# Patient Record
Sex: MALE | Race: BLACK OR AFRICAN AMERICAN | NOT HISPANIC OR LATINO | URBAN - METROPOLITAN AREA
[De-identification: names, ages, dates, MRNs, and addresses within clinical notes are randomized per-mention and may not be internally consistent; named-entity substitution may affect disease eponyms.]

---

## 2024-03-29 ENCOUNTER — EMERGENCY (EMERGENCY)
Facility: HOSPITAL | Age: 44
LOS: 1 days | Discharge: HOME | End: 2024-03-29
Attending: EMERGENCY MEDICINE
Payer: MEDICAID

## 2024-03-29 VITALS
HEART RATE: 92 BPM | OXYGEN SATURATION: 97 % | RESPIRATION RATE: 18 BRPM | SYSTOLIC BLOOD PRESSURE: 174 MMHG | WEIGHT: 300.05 LBS | DIASTOLIC BLOOD PRESSURE: 111 MMHG | TEMPERATURE: 98 F | HEIGHT: 73 IN

## 2024-03-29 PROCEDURE — 90715 TDAP VACCINE 7 YRS/> IM: CPT

## 2024-03-29 PROCEDURE — 12013 RPR F/E/E/N/L/M 2.6-5.0 CM: CPT

## 2024-03-29 PROCEDURE — 99284 EMERGENCY DEPT VISIT MOD MDM: CPT | Mod: 25

## 2024-03-29 PROCEDURE — 90471 IMMUNIZATION ADMIN: CPT

## 2024-03-29 PROCEDURE — 72125 CT NECK SPINE W/O DYE: CPT | Mod: 26,MC

## 2024-03-29 PROCEDURE — 72125 CT NECK SPINE W/O DYE: CPT | Mod: MC

## 2024-03-29 PROCEDURE — 70450 CT HEAD/BRAIN W/O DYE: CPT | Mod: 26,MC

## 2024-03-29 PROCEDURE — 70450 CT HEAD/BRAIN W/O DYE: CPT | Mod: MC

## 2024-03-29 PROCEDURE — 99283 EMERGENCY DEPT VISIT LOW MDM: CPT | Mod: 25

## 2024-03-29 RX ORDER — LIDOCAINE HYDROCHLORIDE AND EPINEPHRINE 10; 10 MG/ML; UG/ML
5 INJECTION, SOLUTION INFILTRATION; PERINEURAL ONCE
Refills: 0 | Status: COMPLETED | OUTPATIENT
Start: 2024-03-29 | End: 2024-03-29

## 2024-03-29 RX ORDER — TETANUS TOXOID, REDUCED DIPHTHERIA TOXOID AND ACELLULAR PERTUSSIS VACCINE, ADSORBED 5; 2.5; 8; 8; 2.5 [IU]/.5ML; [IU]/.5ML; UG/.5ML; UG/.5ML; UG/.5ML
0.5 SUSPENSION INTRAMUSCULAR ONCE
Refills: 0 | Status: COMPLETED | OUTPATIENT
Start: 2024-03-29 | End: 2024-03-29

## 2024-03-29 RX ADMIN — TETANUS TOXOID, REDUCED DIPHTHERIA TOXOID AND ACELLULAR PERTUSSIS VACCINE, ADSORBED 0.5 MILLILITER(S): 5; 2.5; 8; 8; 2.5 SUSPENSION INTRAMUSCULAR at 23:19

## 2024-03-29 RX ADMIN — LIDOCAINE HYDROCHLORIDE AND EPINEPHRINE 5 MILLILITER(S): 10; 10 INJECTION, SOLUTION INFILTRATION; PERINEURAL at 22:21

## 2024-03-29 NOTE — ED PROCEDURE NOTE - ATTENDING CONTRIBUTION TO CARE
I Cristhian Peters MD discussed the procedure with the resident and or ACP and went over risks and benefits as well as indications for the procedure. I was present for key portions of the procedure itself and assisted as needed.

## 2024-03-29 NOTE — ED PROVIDER NOTE - PROGRESS NOTE DETAILS
Elvira SOTO: Patient re-evaluated and feeling improved.  Radiology tests reviewed with patient and family.  Patient stable for discharge. Follow up instructions given, importance of follow up emphasized, return to ED parameters reviewed and patient verbalized understanding.  All questions answered, all concerns addressed.

## 2024-03-29 NOTE — ED ADULT NURSE NOTE - ADDITIONAL LOCATION
Occupational History    Not on file   Tobacco Use    Smoking status: Never    Smokeless tobacco: Not on file   Vaping Use    Vaping Use: Never used   Substance and Sexual Activity    Alcohol use: Not Currently    Drug use: Never    Sexual activity: Not Currently   Other Topics Concern    Not on file   Social History Narrative    Not on file     Social Determinants of Health     Financial Resource Strain: Not on file   Food Insecurity: Not on file   Transportation Needs: Not on file   Physical Activity: Not on file   Stress: Not on file   Social Connections: Not on file   Intimate Partner Violence: Not on file   Housing Stability: Not on file      [] Unable to obtain due to ventilated and/ or neurologic status    Home Medications:      Medications Prior to Admission: nitrofurantoin, macrocrystal-monohydrate, (MACROBID) 100 MG capsule, Take 1 capsule by mouth 2 times daily for 10 days (Patient not taking: Reported on 8/15/2023)  tamsulosin (FLOMAX) 0.4 MG capsule, Take 1 capsule by mouth daily  phenazopyridine (PYRIDIUM) 100 MG tablet, Take 1 tablet by mouth 3 times daily as needed for Pain (bladder spasm/pain)  gabapentin (NEURONTIN) 300 MG capsule, Take 1 capsule by mouth 3 times daily.   aspirin 81 MG chewable tablet, Take 1 tablet by mouth daily  clopidogrel (PLAVIX) 75 MG tablet, Take 1 tablet by mouth daily  pravastatin (PRAVACHOL) 40 MG tablet, Take 1 tablet by mouth daily  oxyCODONE-acetaminophen (PERCOCET)  MG per tablet, Take 1 tablet by mouth every 4 hours as needed for Pain.  losartan (COZAAR) 25 MG tablet, Take 1 tablet by mouth daily  DOCUSATE SODIUM PO, Take 100 mg by mouth  glipiZIDE (GLUCOTROL XL) 5 MG extended release tablet, Take 1 tablet by mouth  baclofen (LIORESAL) 20 MG tablet, Take 1 tablet by mouth 3 times daily    Current Hospital Medications:   Scheduled Meds:   enoxaparin  40 mg SubCUTAneous Daily    clopidogrel  75 mg Oral Daily    gabapentin  300 mg Oral TID    losartan  25 mg Oral reviewed  Scheduled Meds:   enoxaparin  40 mg SubCUTAneous Daily    clopidogrel  75 mg Oral Daily    gabapentin  300 mg Oral TID    losartan  25 mg Oral Daily    pravastatin  40 mg Oral Daily    pantoprazole (PROTONIX) 40 mg injection  40 mg IntraVENous Daily    tamsulosin  0.4 mg Oral Daily    finasteride  5 mg Oral Daily     Continuous Infusions:   sodium chloride Stopped (08/15/23 0227)    dextrose 5 % and 0.45 % NaCl 150 mL/hr at 08/15/23 0227    insulin 0.0125 Units/kg/hr (08/15/23 0622)       Recent Labs     08/12/23  1645 08/14/23  1745 08/14/23  2055 08/15/23  0446   WBC 6.3 7.2  --  6.5   HGB 16.0 15.5 16.2 13.0*   HCT 47.9 45.4  --  37.9*   MCV 88.5 88.2  --  88.0    269  --  233     Recent Labs     08/12/23  1645 08/12/23  1654 08/14/23  1745 08/14/23  2055 08/15/23  0446   *  --  132*  --  134*   K 5.3*  --  4.5  --  3.9   CL 92*  --  94*  --  105   CO2 16*  --  14*  --  18*   PHOS  --   --   --   --  1.3*   BUN 10  --  7  --  5*   CREATININE 0.81   < > 0.79 0.3* 0.69*    < > = values in this interval not displayed. Recent Labs     08/12/23 1645 08/14/23 1745   AST 12 12   ALT 16 14   BILITOT 0.5 0.5   ALKPHOS 103 95     Recent Labs     08/12/23 1645   LIPASE 13     Recent Labs     08/12/23 1645 08/14/23 1745   PROT 8.7* 8.5*     CT ABDOMEN PELVIS W IV CONTRAST Additional Contrast? None    Result Date: 8/12/2023  EXAMINATION: CT OF THE ABDOMEN AND PELVIS WITH CONTRAST 8/12/2023 5:20 pm TECHNIQUE: CT of the abdomen and pelvis was performed with the administration of intravenous contrast. Multiplanar reformatted images are provided for review. Automated exposure control, iterative reconstruction, and/or weight based adjustment of the mA/kV was utilized to reduce the radiation dose to as low as reasonably achievable.  COMPARISON: 05/26/2018 HISTORY: ORDERING SYSTEM PROVIDED HISTORY: increasing pelvic pain TECHNOLOGIST PROVIDED HISTORY: Additional Contrast?->None Reason for additional location...

## 2024-03-29 NOTE — ED ADULT NURSE NOTE - OBJECTIVE STATEMENT
43y M A&Ox4 BIBEMS s/p fall. As per EMS, PT was at the [employer] and fooling around outside when he lost his footing and fell forward into a metal gate. Pt sustained a laceration to the left eyebrow, small laceration to right forehead and abrasion to the right side of the mouth. EMS also states pt had a few drinks earlier today, however did not LOC and was ambulatory on scene. Pt states he was fooling around and lost his footing and fell into a spiked gate. Upon assessment pt head wrapped by EMS. Oozing blood noted to left side of forehead. When removed, pt has 4cm laceration to left eyebrow, 1.5cm to right nadeem and abrasion from the left side of the mouth to the left jaw. No uncontrolled bleeding noted. Pt hypertensive (165/109). Denies any pain, LOC, Ha, N/V/D/CP/SOB/Gi symptoms. NO PMH or PSH.

## 2024-03-29 NOTE — ED PROVIDER NOTE - OBJECTIVE STATEMENT
43-year-old male past medical history of hypertension now presenting after a mechanical/accidental fall ground-level at 7 PM today without loss of consciousness/seizures.  Patient denying additional physical injuries.  Denies fevers, sore throat, headaches, vision changes, neck pain/stiffness, shortness of breath, chest pain, nausea/vomiting, abdominal pain, skin bruising/ecchymosis, urinary/bowel complaints.

## 2024-03-29 NOTE — ED PROVIDER NOTE - PATIENT PORTAL LINK FT
You can access the FollowMyHealth Patient Portal offered by [facility] by registering at the following website: [email or web address]. By joining [employer]’s FollowMyHealth portal, you will also be able to view your health information using other applications (apps) compatible with our system.

## 2024-03-29 NOTE — ED PROVIDER NOTE - PHYSICAL EXAMINATION
PHYSICAL EXAM:  GENERAL: NAD, lying in bed comfortably  HEAD: Large vertical laceration 2 inch to left forehead, depth identified to bone, no foreign bodies noted. Small <1 cm on lateral end of right eyebrow, no foreign bodies identified.   EYES: EOMI, PERRLA, conjunctiva and sclera clear  ENT: No erythema/pallor/petechiae/lesions  NECK: Supple  LUNG: CTA b/l; no r/r/w  HEART: RRR, +S1/S2; No m/r/g  ABDOMEN: soft, NT/ND; BS audible   EXTREMITIES:  2+ Peripheral Pulses, brisk cap refill. No clubbing, cyanosis, or edema  NERVOUS SYSTEM:  AAOx3, speech clear. No sensory/motor deficits   SKIN: No rashes or lesions

## 2024-03-29 NOTE — ED PROVIDER NOTE - CLINICAL SUMMARY MEDICAL DECISION MAKING FREE TEXT BOX
43-year-old male past medical history of hypertension now presenting after a mechanical/accidental fall ground-level at 7 PM today. VSS. On exam Large vertical laceration 2 inch to left forehead, depth identified to bone, no foreign bodies noted. Small <1 cm on lateral end of right eyebrow, no foreign bodies identified. Will obtain CT Head/C-spine to r/o fracture, ICH. Will offer primary repair.

## 2024-03-29 NOTE — ED PROVIDER NOTE - NSFOLLOWUPINSTRUCTIONS_ED_ALL_ED_FT
Laceration Care, Adult  A laceration is a cut that may go through all layers of the skin and into the tissue that is right under the skin. Some lacerations heal on their own. Others need to be closed with stitches (sutures), staples, skin adhesive strips, or skin glue.    Proper care of a laceration reduces the risk for infection, helps the laceration heal better, and may prevent scarring.    General tips  Keep the wound clean and dry.  Do not scratch or pick at the wound.  Wash your hands with soap and water for at least 20 seconds before and after touching your wound or changing your bandage (dressing). If soap and water are not available, use hand .  Do not usedisinfectants or antiseptics, such as rubbing alcohol, to clean your wound unless told by your health care provider.  If you were given a dressing, you should change it at least once a day, or as told by your health care provider. You should also change it if it becomes wet or dirty.  How to care for your laceration  If sutures or staples were used:    Keep the wound completely dry for the first 24 hours, or as told by your health care provider. After that time, you may shower or bathe. Do not soak your wound in water until after the sutures or staples have been removed.  Clean the wound once each day, or as told by your health care provider. To do this:  Wash the wound with soap and water.  Rinse the wound with water to remove all soap.  Pat the wound dry with a clean towel. Do not rub the wound.  After cleaning the wound, apply a thin layer of antibiotic ointment, other topical ointments, or a non-adherent dressing as told by your health care provider. This will help prevent infection and keep the dressing from sticking to the wound.  Have the sutures or staples removed as told by your health care provider. Do not remove sutures or staples yourself.  If skin adhesive strips were used:    Do not get the skin adhesive strips wet. You may shower or bathe, but keep the wound dry.  If the wound gets wet, pat it dry with a clean towel. Do not rub the wound.  Skin adhesive strips fall off on their own. If adhesive strip edges start to loosen and curl up, you may trim the loose edges. Do not remove adhesive strips completely unless your health care provider tells you to do that.  If skin glue was used:    You may shower or bathe, but try to keep the wound dry. Do not soak the wound in water.  After showering or bathing, pat the wound dry with a clean towel. Do not rub the wound.  Do not do any activities that will make you sweat a lot until the skin glue has fallen off.  Do not apply liquid, cream, or ointment medicine to the wound while the skin glue is in place. Doing this may loosen the film before the wound has healed.  If a dressing is placed over the wound, do not apply tape directly over the skin glue. Doing this may cause the glue to be pulled off before the wound has healed.  Do not pick at the glue. Skin glue usually remains in place for 5–10 days and then falls off the skin.  Follow these instructions at home:  Medicines    Take over-the-counter and prescription medicines only as told by your health care provider.  If you were prescribed an antibiotic medicine or ointment, take or apply it as told by your health care provider. Do not stop using it even if your condition improves.  Managing pain and swelling    If directed, put ice on the injured area. To do this:  Put ice in a plastic bag.  Place a towel between your skin and the bag.  Leave the ice on for 20 minutes, 2–3 times a day.  Remove the ice if your skin turns bright red. This is very important. If you cannot feel pain, heat, or cold, you have a greater risk of damage to the area.  Raise (elevate) the injured area above the level of your heart while you are sitting or lying down for the first 24–48 hours after the laceration is repaired.  General instructions    Two wounds closed with skin glue. One is normal. The other is red with pus and infected.  Avoid any activity that could cause your wound to reopen.  Check your wound every day for signs of infection. Watch for:  More redness, swelling, or pain.  Fluid or blood.  Warmth.  Pus or a bad smell.  Keep all follow-up visits. This is important.  Contact a health care provider if:  You received a tetanus shot and you have swelling, severe pain, redness, or bleeding at the injection site.  Your closed wound breaks open.  You have any of these signs of infection:  More redness, swelling, or pain around your wound.  Fluid or blood coming from your wound.  Warmth coming from your wound.  Pus or a bad smell coming from your wound.  A fever.  You notice something coming out of the wound, such as wood or glass.  Your pain is not controlled with medicine.  You notice a change in the color of your skin near your wound.  You need to change the dressing often.  You develop a new rash.  You have numbness around the wound.  Get help right away if:  You develop severe swelling around the wound.  Your pain suddenly increases and is severe.  You develop painful lumps near the wound or on skin anywhere else on your body.  You have a red streak going away from your wound.  The wound is on your hand or foot, and you cannot properly move a finger or toe.  The wound is on your hand or foot, and you notice that your fingers or toes look pale or bluish.    Summary    A laceration is a cut that may go through all layers of the skin and into the tissue that is right under the skin.  Some lacerations heal on their own. Others need to be closed with stitches (sutures), staples, skin adhesive strips, or skin glue.  Proper care of a laceration reduces the risk of infection, helps the laceration heal better, and may prevent scarring.  This information is not intended to replace advice given to you by your health care provider. Make sure you discuss any questions you have with your health care provider.

## 2024-03-29 NOTE — ED ADULT NURSE NOTE - DISCHARGE DATE/TIME
Called patient regarding ccm assessment and left a message for patient to call back when they can. Reviewed patient chart. Left contact my contact number [phone].
30-Mar-2024 00:05

## 2024-03-29 NOTE — ED PROVIDER NOTE - ATTENDING CONTRIBUTION TO CARE
I performed a history and physical exam of the patient and discussed their management with the resident and /or advanced care provider. I reviewed the resident and /or ACP's note and agree with the documented findings and plan of care. My medical decision making and observations are found above.  Lungs clear, neck non tender, nl neuro exam, Large forehead lac.

## 2024-03-30 VITALS
HEART RATE: 79 BPM | SYSTOLIC BLOOD PRESSURE: 153 MMHG | DIASTOLIC BLOOD PRESSURE: 89 MMHG | TEMPERATURE: 98 F | OXYGEN SATURATION: 99 % | RESPIRATION RATE: 18 BRPM

## 2024-04-06 ENCOUNTER — EMERGENCY (EMERGENCY)
Facility: HOSPITAL | Age: 44
LOS: 1 days | Discharge: HOME | End: 2024-04-06
Attending: EMERGENCY MEDICINE
Payer: MEDICAID

## 2024-04-06 VITALS
DIASTOLIC BLOOD PRESSURE: 112 MMHG | HEART RATE: 76 BPM | OXYGEN SATURATION: 97 % | RESPIRATION RATE: 18 BRPM | SYSTOLIC BLOOD PRESSURE: 163 MMHG

## 2024-04-06 VITALS
SYSTOLIC BLOOD PRESSURE: 190 MMHG | RESPIRATION RATE: 18 BRPM | OXYGEN SATURATION: 97 % | DIASTOLIC BLOOD PRESSURE: 123 MMHG | TEMPERATURE: 98 F | HEIGHT: 73 IN | HEART RATE: 85 BPM | WEIGHT: 300.05 LBS

## 2024-04-06 PROBLEM — Z78.9 OTHER SPECIFIED HEALTH STATUS: Chronic | Status: ACTIVE | Noted: 2024-03-29

## 2024-04-06 PROCEDURE — 99282 EMERGENCY DEPT VISIT SF MDM: CPT

## 2024-04-06 PROCEDURE — L9995: CPT

## 2024-04-06 NOTE — ED PROVIDER NOTE - PATIENT PORTAL LINK FT
You can access the FollowMyHealth Patient Portal offered by [facility] by registering at the following website: [email or web address]. By joining [employer]’s FollowMyHealth portal, you will also be able to view your health information using other applications (apps) compatible with our system.

## 2024-04-06 NOTE — ED PROVIDER NOTE - OBJECTIVE STATEMENT
43-year-old male with past medical history of hypertension presenting for suture removal. Patient fell last week requiring 10 sutures above left eyebrow and 1 suture over the right eyebrow. Patient presenting today for removal. Patient was noted to have high blood pressure in triage however patient states he did not take his blood pressure medication today. Patient denies headaches, changes in vision, nausea, vomiting, chest pain, difficulty breathing, abdominal pain.

## 2024-04-06 NOTE — ED ADULT NURSE NOTE - OBJECTIVE STATEMENT
43 y.o male, A&Ox4, PMH HTN, pt presents to ED c/o suture removal. pt states he banged his head last friday that required stitches, pt presents today for removal of stitches. pt states he has HTN but has not taken his BP meds this morning. pt denies chest pain, sob, N/V/D, fevers, chills. pt safety and comfort provided.

## 2024-04-06 NOTE — ED PROVIDER NOTE - NSFOLLOWUPINSTRUCTIONS_ED_ALL_ED_FT
Please follow up with your primary care physician within the next 48-72 hours for blood pressure management.    You may take Tylenol (1000mg) every 6 hours for pain control.     Please return to the emergency department if you experience any of the following symptoms:    Fever  Chest pain  Difficulty breathing  Abdominal pain  Nausea  Vomiting

## 2024-04-06 NOTE — ED PROVIDER NOTE - ATTENDING CONTRIBUTION TO CARE
attending Ray: 43yM h/o HTN presents for suture removal. Had fall last week with 10 sutures placed above L eyebrow and 1 over R eyebrow. Healing well. Exam as above. Did not take BP medication today. Will remove sutures, dc with return precautions

## 2024-04-06 NOTE — ED PROVIDER NOTE - PHYSICAL EXAMINATION
General: Appears well and nontoxic  Mentation: AAO x 3  psych: mood appropriate  HEENT: airway patent, conjunctivae clear bilaterally  Resp: symmetric chest rise, no resp distress, breath sounds CTA bilaterally  Cardio: RRR, no m/r/g  GI: soft/nondistended/nontender  Neuro: sensation and motor function grossly intact  Skin: Well-healed laceration over left eyebrow and right eyebrow  MSK: normal movement of all extremities  Lymph/Vasc: no LE edema